# Patient Record
Sex: MALE | Race: BLACK OR AFRICAN AMERICAN | NOT HISPANIC OR LATINO | URBAN - METROPOLITAN AREA
[De-identification: names, ages, dates, MRNs, and addresses within clinical notes are randomized per-mention and may not be internally consistent; named-entity substitution may affect disease eponyms.]

---

## 2018-03-29 ENCOUNTER — INPATIENT (INPATIENT)
Facility: HOSPITAL | Age: 33
LOS: 0 days | Discharge: HOME | DRG: 446 | End: 2018-03-30
Attending: SURGERY | Admitting: SURGERY
Payer: COMMERCIAL

## 2018-03-29 VITALS
SYSTOLIC BLOOD PRESSURE: 120 MMHG | OXYGEN SATURATION: 99 % | DIASTOLIC BLOOD PRESSURE: 71 MMHG | TEMPERATURE: 98 F | RESPIRATION RATE: 18 BRPM | HEART RATE: 72 BPM | WEIGHT: 185.19 LBS

## 2018-03-29 LAB
ALBUMIN SERPL ELPH-MCNC: 4.5 G/DL — SIGNIFICANT CHANGE UP (ref 3.3–5)
ALP SERPL-CCNC: 60 U/L — SIGNIFICANT CHANGE UP (ref 40–120)
ALT FLD-CCNC: 18 U/L — SIGNIFICANT CHANGE UP (ref 10–45)
ANION GAP SERPL CALC-SCNC: 10 MMOL/L — SIGNIFICANT CHANGE UP (ref 5–17)
APPEARANCE UR: CLEAR — SIGNIFICANT CHANGE UP
APTT BLD: 29.5 SEC — SIGNIFICANT CHANGE UP (ref 27.5–37.4)
AST SERPL-CCNC: 21 U/L — SIGNIFICANT CHANGE UP (ref 10–40)
BASOPHILS NFR BLD AUTO: 0.5 % — SIGNIFICANT CHANGE UP (ref 0–2)
BILIRUB SERPL-MCNC: 0.4 MG/DL — SIGNIFICANT CHANGE UP (ref 0.2–1.2)
BILIRUB UR-MCNC: NEGATIVE — SIGNIFICANT CHANGE UP
BUN SERPL-MCNC: 17 MG/DL — SIGNIFICANT CHANGE UP (ref 7–23)
CALCIUM SERPL-MCNC: 9.6 MG/DL — SIGNIFICANT CHANGE UP (ref 8.4–10.5)
CHLORIDE SERPL-SCNC: 102 MMOL/L — SIGNIFICANT CHANGE UP (ref 96–108)
CO2 SERPL-SCNC: 28 MMOL/L — SIGNIFICANT CHANGE UP (ref 22–31)
COLOR SPEC: YELLOW — SIGNIFICANT CHANGE UP
CREAT SERPL-MCNC: 1.17 MG/DL — SIGNIFICANT CHANGE UP (ref 0.5–1.3)
DIFF PNL FLD: NEGATIVE — SIGNIFICANT CHANGE UP
EOSINOPHIL NFR BLD AUTO: 7.6 % — HIGH (ref 0–6)
GLUCOSE SERPL-MCNC: 112 MG/DL — HIGH (ref 70–99)
GLUCOSE UR QL: NEGATIVE — SIGNIFICANT CHANGE UP
HCT VFR BLD CALC: 41.5 % — SIGNIFICANT CHANGE UP (ref 39–50)
HGB BLD-MCNC: 14 G/DL — SIGNIFICANT CHANGE UP (ref 13–17)
INR BLD: 1.04 — SIGNIFICANT CHANGE UP (ref 0.88–1.16)
KETONES UR-MCNC: NEGATIVE — SIGNIFICANT CHANGE UP
LEUKOCYTE ESTERASE UR-ACNC: NEGATIVE — SIGNIFICANT CHANGE UP
LIDOCAIN IGE QN: 37 U/L — SIGNIFICANT CHANGE UP (ref 7–60)
LYMPHOCYTES # BLD AUTO: 36.9 % — SIGNIFICANT CHANGE UP (ref 13–44)
MCHC RBC-ENTMCNC: 28.7 PG — SIGNIFICANT CHANGE UP (ref 27–34)
MCHC RBC-ENTMCNC: 33.7 G/DL — SIGNIFICANT CHANGE UP (ref 32–36)
MCV RBC AUTO: 85.2 FL — SIGNIFICANT CHANGE UP (ref 80–100)
MONOCYTES NFR BLD AUTO: 8.5 % — SIGNIFICANT CHANGE UP (ref 2–14)
NEUTROPHILS NFR BLD AUTO: 46.5 % — SIGNIFICANT CHANGE UP (ref 43–77)
NITRITE UR-MCNC: NEGATIVE — SIGNIFICANT CHANGE UP
PH UR: 6 — SIGNIFICANT CHANGE UP (ref 5–8)
PLATELET # BLD AUTO: 235 K/UL — SIGNIFICANT CHANGE UP (ref 150–400)
POTASSIUM SERPL-MCNC: 4.5 MMOL/L — SIGNIFICANT CHANGE UP (ref 3.5–5.3)
POTASSIUM SERPL-SCNC: 4.5 MMOL/L — SIGNIFICANT CHANGE UP (ref 3.5–5.3)
PROT SERPL-MCNC: 7.7 G/DL — SIGNIFICANT CHANGE UP (ref 6–8.3)
PROT UR-MCNC: NEGATIVE MG/DL — SIGNIFICANT CHANGE UP
PROTHROM AB SERPL-ACNC: 11.5 SEC — SIGNIFICANT CHANGE UP (ref 9.8–12.7)
RBC # BLD: 4.87 M/UL — SIGNIFICANT CHANGE UP (ref 4.2–5.8)
RBC # FLD: 11.7 % — SIGNIFICANT CHANGE UP (ref 10.3–16.9)
SODIUM SERPL-SCNC: 140 MMOL/L — SIGNIFICANT CHANGE UP (ref 135–145)
SP GR SPEC: <=1.005 — SIGNIFICANT CHANGE UP (ref 1–1.03)
UROBILINOGEN FLD QL: 0.2 E.U./DL — SIGNIFICANT CHANGE UP
WBC # BLD: 4.3 K/UL — SIGNIFICANT CHANGE UP (ref 3.8–10.5)
WBC # FLD AUTO: 4.3 K/UL — SIGNIFICANT CHANGE UP (ref 3.8–10.5)

## 2018-03-29 PROCEDURE — 74177 CT ABD & PELVIS W/CONTRAST: CPT | Mod: 26

## 2018-03-29 PROCEDURE — 99285 EMERGENCY DEPT VISIT HI MDM: CPT

## 2018-03-29 RX ORDER — CIPROFLOXACIN LACTATE 400MG/40ML
400 VIAL (ML) INTRAVENOUS ONCE
Qty: 0 | Refills: 0 | Status: COMPLETED | OUTPATIENT
Start: 2018-03-29 | End: 2018-03-29

## 2018-03-29 RX ORDER — SODIUM CHLORIDE 9 MG/ML
1000 INJECTION, SOLUTION INTRAVENOUS
Qty: 0 | Refills: 0 | Status: DISCONTINUED | OUTPATIENT
Start: 2018-03-29 | End: 2018-03-30

## 2018-03-29 RX ORDER — ONDANSETRON 8 MG/1
4 TABLET, FILM COATED ORAL EVERY 6 HOURS
Qty: 0 | Refills: 0 | Status: DISCONTINUED | OUTPATIENT
Start: 2018-03-29 | End: 2018-03-30

## 2018-03-29 RX ORDER — CIPROFLOXACIN LACTATE 400MG/40ML
400 VIAL (ML) INTRAVENOUS EVERY 12 HOURS
Qty: 0 | Refills: 0 | Status: DISCONTINUED | OUTPATIENT
Start: 2018-03-30 | End: 2018-03-30

## 2018-03-29 RX ORDER — IOHEXOL 300 MG/ML
50 INJECTION, SOLUTION INTRAVENOUS ONCE
Qty: 0 | Refills: 0 | Status: COMPLETED | OUTPATIENT
Start: 2018-03-29 | End: 2018-03-29

## 2018-03-29 RX ORDER — HEPARIN SODIUM 5000 [USP'U]/ML
5000 INJECTION INTRAVENOUS; SUBCUTANEOUS EVERY 8 HOURS
Qty: 0 | Refills: 0 | Status: DISCONTINUED | OUTPATIENT
Start: 2018-03-29 | End: 2018-03-30

## 2018-03-29 RX ORDER — METRONIDAZOLE 500 MG
500 TABLET ORAL ONCE
Qty: 0 | Refills: 0 | Status: COMPLETED | OUTPATIENT
Start: 2018-03-29 | End: 2018-03-29

## 2018-03-29 RX ORDER — METRONIDAZOLE 500 MG
500 TABLET ORAL EVERY 8 HOURS
Qty: 0 | Refills: 0 | Status: DISCONTINUED | OUTPATIENT
Start: 2018-03-30 | End: 2018-03-30

## 2018-03-29 RX ORDER — HYDROMORPHONE HYDROCHLORIDE 2 MG/ML
0.5 INJECTION INTRAMUSCULAR; INTRAVENOUS; SUBCUTANEOUS EVERY 4 HOURS
Qty: 0 | Refills: 0 | Status: DISCONTINUED | OUTPATIENT
Start: 2018-03-29 | End: 2018-03-30

## 2018-03-29 RX ORDER — SODIUM CHLORIDE 9 MG/ML
3 INJECTION INTRAMUSCULAR; INTRAVENOUS; SUBCUTANEOUS ONCE
Qty: 0 | Refills: 0 | Status: COMPLETED | OUTPATIENT
Start: 2018-03-29 | End: 2018-03-29

## 2018-03-29 RX ADMIN — HEPARIN SODIUM 5000 UNIT(S): 5000 INJECTION INTRAVENOUS; SUBCUTANEOUS at 22:49

## 2018-03-29 RX ADMIN — SODIUM CHLORIDE 3 MILLILITER(S): 9 INJECTION INTRAMUSCULAR; INTRAVENOUS; SUBCUTANEOUS at 16:06

## 2018-03-29 RX ADMIN — IOHEXOL 50 MILLILITER(S): 300 INJECTION, SOLUTION INTRAVENOUS at 16:11

## 2018-03-29 RX ADMIN — Medication 200 MILLIGRAM(S): at 19:45

## 2018-03-29 RX ADMIN — SODIUM CHLORIDE 125 MILLILITER(S): 9 INJECTION, SOLUTION INTRAVENOUS at 22:37

## 2018-03-29 RX ADMIN — Medication 100 MILLIGRAM(S): at 21:00

## 2018-03-29 NOTE — H&P ADULT - HISTORY OF PRESENT ILLNESS
33 yo male with no PMH/PSH, presents to the ED with 3 weeks of intermittent abdominal pain. Patient describes pain as intermittent, localized in epigastric and mid abdomen, non-radiating, sharp, sometimes related to food. Denies fevers, chills, nausea, emesis, diarrhea, constipation or urinary symptoms. Saw MD few days ago that prescribed antacids with no relief. Had CT yesterday that showed possibly inflammed appendix and sigmoid colitis. Patient was sent to the ED for further evaluation.    Denies family hx of IBD/colon ca.

## 2018-03-29 NOTE — ED PROVIDER NOTE - ATTENDING CONTRIBUTION TO CARE
The pt is a 31 y/o M, who presents to ED for eval of abd pain x 3-4 wks, had outpatient ct yest and read was inconclusive, hence sent to ED by pmd for ct and eval. Pt states pain was initially to mid and upper abd, then became diffuse, pain cramp like, aggravated w/eating, has not been taking any pain meds.  Ate burger PTA. Denies n/v/d, anorexia, fevers, chills, dysuria, flank pain, cp, sob .  Pt with mild rlq tenderness on exam, ct with tip appendicitis, eval by surgery who will admit pt for observation.  Otherwise nontoxic appearing with no acute complaints.

## 2018-03-29 NOTE — ED ADULT NURSE NOTE - OBJECTIVE STATEMENT
Pt presents to ED c/o abdominal pain x3 weeks. Pt with intermittent abdominal pain, first week constant, now last two weeks pt reports pain mostly after eating then resolves. Pt ate a burger about an hours ago, had pain in waiting room per pt but now no pain. Pt reports pain to LUQ and upper medial abdomen. Pt also reports intermittent diarrhea. Pt denies fevers, chills, N/V. Pt had CT scan outpt yesterday, has read of scan and lab results but does not have disc of scan. Pt presents in NAD speaking full sentences ambulatory through triage.

## 2018-03-29 NOTE — ED ADULT TRIAGE NOTE - CHIEF COMPLAINT QUOTE
c/o generalized pain x 3 weeks. Denies n/v/d, f/c. Pt states had CT done yesterday and was informed to go to the ED r/o acute appendicitis/colitis.

## 2018-03-29 NOTE — H&P ADULT - NSHPLABSRESULTS_GEN_ALL_CORE
Comprehensive Metabolic Panel (03.29.18 @ 16:09)    Sodium, Serum: 140 mmol/L    Potassium, Serum: 4.5 mmol/L    Chloride, Serum: 102 mmol/L    Carbon Dioxide, Serum: 28 mmol/L    Anion Gap, Serum: 10 mmol/L    Blood Urea Nitrogen, Serum: 17 mg/dL    Creatinine, Serum: 1.17 mg/dL    Glucose, Serum: 112 mg/dL    Calcium, Total Serum: 9.6 mg/dL    Protein Total, Serum: 7.7 g/dL    Albumin, Serum: 4.5 g/dL    Bilirubin Total, Serum: 0.4 mg/dL    Alkaline Phosphatase, Serum: 60 U/L    Aspartate Aminotransferase (AST/SGOT): 21 U/L    Alanine Aminotransferase (ALT/SGPT): 18 U/L    Complete Blood Count + Automated Diff (03.29.18 @ 16:09)    WBC Count: 4.3 K/uL    RBC Count: 4.87 M/uL    Hemoglobin: 14.0 g/dL    Hematocrit: 41.5 %    Mean Cell Volume: 85.2 fL    Mean Cell Hemoglobin: 28.7 pg    Mean Cell Hemoglobin Conc: 33.7 g/dL    Red Cell Distrib Width: 11.7 %    Platelet Count - Automated: 235 K/uL    Urinalysis (03.29.18 @ 16:20)    Glucose Qualitative, Urine: NEGATIVE    Blood, Urine: NEGATIVE    pH Urine: 6.0    Color: Yellow    Urine Appearance: Clear    Bilirubin: Negative    Ketone - Urine: NEGATIVE    Specific Gravity: <=1.005    Protein, Urine: NEGATIVE mg/dL    Urobilinogen: 0.2 E.U./dL    Nitrite: NEGATIVE    Leukocyte Esterase Concentration: NEGATIVE    < from: CT Abdomen and Pelvis w/ Oral Cont and w/ IV Cont (03.29.18 @ 17:35) >    IMPRESSION:   1.  Findings suspicious for tip appendicitis.   2.  Nonspecific colitis involving the ascending, descending and sigmoid   colon. Infectious versus inflammatory etiology.  3.  Mild cardiomegaly, atypical in a patient of this age. Recommend   echocardiogram.  4.  Mild wall thickening of the urinary bladder at the level of the   fundus. Mild prostatic enlargement for a patient of this age. Correlation   with urinalysis is recommended.

## 2018-03-29 NOTE — ED PROVIDER NOTE - MEDICAL DECISION MAKING DETAILS
pt w/abd pain x 3-4 wks, had outpatient ct yest but read w/o definitive dx hence w/u repeated - normal labs, ct w/tip appy, surg consulted and will admit for iv abx and further tx

## 2018-03-29 NOTE — ED PROVIDER NOTE - OBJECTIVE STATEMENT
The pt is a 31 y/o M, who presents to ED for eval of abd pain x 3-4 wks, had outpatient ct yest and read was inconclusive, hence sent to ED by pmd for ct and eval. Pt states pain was initially to mid and upper abd, then became diffuse, pain cramp like, aggravated w/eating, has not been taking any pain meds.  Ate burger PTA. Denies n/v/d, anorexia, fevers, chills, dysuria, flank pain, cp, sob

## 2018-03-29 NOTE — H&P ADULT - NSHPPHYSICALEXAM_GEN_ALL_CORE
Alert and oriented x 3, in no acute distress  Non-labored breathing on RA  RRR, normotensive  Abdomen soft, non-distended, mildly tender in right mid abdomen, no rebound or guarding

## 2018-03-30 VITALS
TEMPERATURE: 99 F | DIASTOLIC BLOOD PRESSURE: 61 MMHG | OXYGEN SATURATION: 97 % | SYSTOLIC BLOOD PRESSURE: 111 MMHG | HEART RATE: 68 BPM | RESPIRATION RATE: 17 BRPM

## 2018-03-30 LAB
HCT VFR BLD CALC: 40.8 % — SIGNIFICANT CHANGE UP (ref 39–50)
HGB BLD-MCNC: 13.6 G/DL — SIGNIFICANT CHANGE UP (ref 13–17)
MCHC RBC-ENTMCNC: 29 PG — SIGNIFICANT CHANGE UP (ref 27–34)
MCHC RBC-ENTMCNC: 33.3 G/DL — SIGNIFICANT CHANGE UP (ref 32–36)
MCV RBC AUTO: 87 FL — SIGNIFICANT CHANGE UP (ref 80–100)
PLATELET # BLD AUTO: 199 K/UL — SIGNIFICANT CHANGE UP (ref 150–400)
RBC # BLD: 4.69 M/UL — SIGNIFICANT CHANGE UP (ref 4.2–5.8)
RBC # FLD: 11.9 % — SIGNIFICANT CHANGE UP (ref 10.3–16.9)
WBC # BLD: 4.3 K/UL — SIGNIFICANT CHANGE UP (ref 3.8–10.5)
WBC # FLD AUTO: 4.3 K/UL — SIGNIFICANT CHANGE UP (ref 3.8–10.5)

## 2018-03-30 PROCEDURE — 83690 ASSAY OF LIPASE: CPT

## 2018-03-30 PROCEDURE — 76705 ECHO EXAM OF ABDOMEN: CPT

## 2018-03-30 PROCEDURE — 86900 BLOOD TYPING SEROLOGIC ABO: CPT

## 2018-03-30 PROCEDURE — 99285 EMERGENCY DEPT VISIT HI MDM: CPT | Mod: 25

## 2018-03-30 PROCEDURE — 36415 COLL VENOUS BLD VENIPUNCTURE: CPT

## 2018-03-30 PROCEDURE — 76705 ECHO EXAM OF ABDOMEN: CPT | Mod: 26

## 2018-03-30 PROCEDURE — 86901 BLOOD TYPING SEROLOGIC RH(D): CPT

## 2018-03-30 PROCEDURE — 81003 URINALYSIS AUTO W/O SCOPE: CPT

## 2018-03-30 PROCEDURE — 85027 COMPLETE CBC AUTOMATED: CPT

## 2018-03-30 PROCEDURE — 85730 THROMBOPLASTIN TIME PARTIAL: CPT

## 2018-03-30 PROCEDURE — 85025 COMPLETE CBC W/AUTO DIFF WBC: CPT

## 2018-03-30 PROCEDURE — 85610 PROTHROMBIN TIME: CPT

## 2018-03-30 PROCEDURE — 86850 RBC ANTIBODY SCREEN: CPT

## 2018-03-30 PROCEDURE — 80053 COMPREHEN METABOLIC PANEL: CPT

## 2018-03-30 PROCEDURE — 74177 CT ABD & PELVIS W/CONTRAST: CPT

## 2018-03-30 PROCEDURE — 96374 THER/PROPH/DIAG INJ IV PUSH: CPT | Mod: XU

## 2018-03-30 RX ORDER — ACETAMINOPHEN 500 MG
650 TABLET ORAL ONCE
Qty: 0 | Refills: 0 | Status: COMPLETED | OUTPATIENT
Start: 2018-03-30 | End: 2018-03-30

## 2018-03-30 RX ADMIN — HEPARIN SODIUM 5000 UNIT(S): 5000 INJECTION INTRAVENOUS; SUBCUTANEOUS at 13:50

## 2018-03-30 RX ADMIN — Medication 650 MILLIGRAM(S): at 16:29

## 2018-03-30 RX ADMIN — HEPARIN SODIUM 5000 UNIT(S): 5000 INJECTION INTRAVENOUS; SUBCUTANEOUS at 05:38

## 2018-03-30 RX ADMIN — Medication 100 MILLIGRAM(S): at 12:05

## 2018-03-30 RX ADMIN — Medication 200 MILLIGRAM(S): at 07:25

## 2018-03-30 RX ADMIN — Medication 100 MILLIGRAM(S): at 05:38

## 2018-03-30 NOTE — DISCHARGE NOTE ADULT - CARE PLAN
Principal Discharge DX:	Appendicitis  Goal:	follow up in the office  Assessment and plan of treatment:	patient unlikely to have acute appendicitis, more likely biliary in etiology, patient to see dr. sonal duron in office and receive a HIDA scan as an outpatient, patient can take tylenol PRN pain

## 2018-03-30 NOTE — DISCHARGE NOTE ADULT - PATIENT PORTAL LINK FT
You can access the [facility] Patient Portal, offered by [facility], by registering with the following website: [email or web address]

## 2018-03-30 NOTE — DISCHARGE NOTE ADULT - CARE PROVIDER_API CALL
Darci Dong), Surgery  [address] [place] [address]  Phone: [phone]  Fax: [phone]

## 2018-03-30 NOTE — DISCHARGE NOTE ADULT - PLAN OF CARE
follow up in the office patient unlikely to have acute appendicitis, more likely biliary in etiology, patient to see dr. sonal duron in office and receive a HIDA scan as an outpatient, patient can take tylenol PRN pain

## 2018-03-30 NOTE — PROGRESS NOTE ADULT - SUBJECTIVE AND OBJECTIVE BOX
o/n: admitted with possible early appendicitis o/n: admitted with possible early appendicitis       Vital Signs Last 24 Hrs  T(C): 36.7 (30 Mar 2018 09:57), Max: 36.9 (29 Mar 2018 15:29)  T(F): 98 (30 Mar 2018 09:57), Max: 98.4 (29 Mar 2018 15:29)  HR: 75 (30 Mar 2018 09:57) (52 - 75)  BP: 96/61 (30 Mar 2018 09:57) (96/61 - 124/77)  BP(mean): --  RR: 17 (30 Mar 2018 09:57) (17 - 18)  SpO2: 99% (30 Mar 2018 09:57) (98% - 100%)      I&O's Summary    29 Mar 2018 07:01  -  30 Mar 2018 07:00  --------------------------------------------------------  IN: 975 mL / OUT: 0 mL / NET: 975 mL        Gen: NAD   Abd: soft, mildly ttp, nd       31 yo male with 3 weeks of abdominal pain and CT concerning for early acute appendicitis and unspecified colitis   - admit to surgery under care of Dr. Dong   - NPO   - IVF   - Cipro/Flagyl   - labs in am  - will re evaluate for OR

## 2018-03-30 NOTE — PROGRESS NOTE ADULT - ASSESSMENT
31 yo male with 3 weeks of abdominal pain and CT concerning for early acute appendicitis and unspecified colitis   - admit to surgery under care of Dr. Dong   - NPO   - IVF   - Cipro/Flagyl   - labs in am   - regional level of care   - discussed with chief resident and Dr. Dong

## 2018-04-02 DIAGNOSIS — K52.9 NONINFECTIVE GASTROENTERITIS AND COLITIS, UNSPECIFIED: ICD-10-CM

## 2018-04-02 DIAGNOSIS — R10.9 UNSPECIFIED ABDOMINAL PAIN: ICD-10-CM

## 2018-04-02 DIAGNOSIS — K83.9 DISEASE OF BILIARY TRACT, UNSPECIFIED: ICD-10-CM

## 2018-04-02 DIAGNOSIS — Z28.21 IMMUNIZATION NOT CARRIED OUT BECAUSE OF PATIENT REFUSAL: ICD-10-CM

## 2018-04-18 NOTE — ED PROVIDER NOTE - INPATIENT RESIDENT/ACP NOTIFIED
ORTHOPEDIC CONSULT      Chief Complaint: Medardo Gautam is a 78 year old ambidextrous male who is retired but used to work as a  at [employer] and traveled to [place] etc.  Enjoys playing tennis and golf.      He is being seen for   Chief Complaints and History of Present Illnesses   Patient presents with     Consult     rt wrist pain per Gayle Garner APRN CNP         History of Present Illness:   Medardo Gautam is a 78 year old male who is seen in consultation at the request of Gayle Garner APRN CNP  .  History of Present illness:  Medardo presents for evaluation of:  1.) rt wrist pain   Onset: 4/16/18  Symptoms brought on by fall shoveling snow.   Character:  sharp, dull ache and swelling.    Progression of symptoms:  getting better  Previous similar pain: no,  He denies any surgery or major injury to his wrist other than may be in his childhood.  Patient did have surgery on his hand for Dupuytren's on his small digit approximately 8 years ago.  Pain Level:  4/10.   Previous treatments:  ice, support wrap and NSAID - naproxen advil.  All these treatments have helped slightly.  Patient has not had any injections or any formal therapy.  Patient does have a removable brace.  Currently on Blood thinners? no  Diagnosis of Diabetes? no  Additional History: Patient denies any numbness or tingling.  Patient also states there is no pain unless he moves his wrist.  He has some pain with wrist flexion.    Patient's past medical, surgical, social and family histories reviewed.     Past Medical History:   Diagnosis Date     Anxiety      Colon cancer (H) 01/2015     Contracture of finger joint ca 2005    left and right fifth fingers     Dupuytren's contracture of left hand      Gout      HEMORRHOIDS NOS 6/4/2007     Hypertension      Insomnia      Nonsenile cataract      Personal history of colonic polyps 7 years ago?     Prostate cancer (H) Feb 2012     Renal cyst 6/22/2017     Seborrheic  dermatitis      Skin lesion- R side of face and behind L ear 12/15/2011     SKIN SENSATION DISTURB 12/29/2006    allergic reaction to strawberries     SKIN SENSATION DISTURB 12/29/2006         Past Surgical History:   Procedure Laterality Date     APPENDECTOMY       BIOPSY  01/16/15     C HAND/FINGER SURGERY UNLISTED       C LENGTHEN,TENDON,HAND/FINGER  ca 2007    right fifth     C STOMACH SURGERY PROCEDURE UNLISTED       CATARACT IOL, RT/LT Left 02/15/2018     COLON SURGERY  2/11/2015    Lap assisted R hemicolectomy     COLONOSCOPY  10/25/07    Snare polypectomy     COLONOSCOPY  6/25/2009    with snare polypectomy     COLONOSCOPY  9/30/2009     COLONOSCOPY  1/5/2011    COLONOSCOPY performed by JUD MARIEE at  GI     COLONOSCOPY N/A 1/16/2015    Procedure: COMBINED COLONOSCOPY, SINGLE OR MULTIPLE BIOPSY/POLYPECTOMY BY BIOPSY;  Surgeon: Sarah Beth Pisano MD;  Location: [facility] OR     COLONOSCOPY WITH CO2 INSUFFLATION N/A 1/16/2015    Procedure: COLONOSCOPY WITH CO2 INSUFFLATION;  Surgeon: Sarah Beth Pisano MD;  Location: [facility] OR     DAVINCI PROSTATECTOMY  8/6/2012    Procedure: DAVINCI PROSTATECTOMY;  Davinci Assisted Radical Prostatectomy with Bilateral Lymphadenectomy ;  Surgeon: Norma Goodwin MD;  Location: [facility] OR     GENITOURINARY SURGERY      prostate surgery     INJECT EPIDURAL LUMBAR / SACRAL SINGLE Left 10/30/2017    Procedure: INJECT EPIDURAL LUMBAR / SACRAL SINGLE;  Left Transforaminal Lumbar 4-Lumbar 5 Epidural Steroid Injection;  Surgeon: Nuvia Reina MD;  Location: [facility] OR     LAPAROSCOPIC ASSISTED COLECTOMY N/A 2/11/2015    Procedure: LAPAROSCOPIC ASSISTED COLECTOMY;  Surgeon: Oren Breen MD;  Location: [facility] OR     PHACOEMULSIFICATION CLEAR CORNEA WITH STANDARD INTRAOCULAR LENS IMPLANT Left 2/15/2018    Procedure: PHACOEMULSIFICATION CLEAR CORNEA WITH STANDARD INTRAOCULAR LENS IMPLANT;  LEFT EYE CATARACT EXTRACTION WITH STANDARD INTRAOCULAR LENS IMPLANT ;  Surgeon: Reyna  Brandon Eagle MD;  Location: [facility]     PHACOEMULSIFICATION CLEAR CORNEA WITH STANDARD INTRAOCULAR LENS IMPLANT Right 3/1/2018    Procedure: PHACOEMULSIFICATION CLEAR CORNEA WITH STANDARD INTRAOCULAR LENS IMPLANT;  RIGHT EYE CATARACT EXTRACTION WITH STANDARD INTRAOCULAR LENS IMPLANT ;  Surgeon: Brandon Orellana MD;  Location: [facility]       Medications:    Current Outpatient Prescriptions on File Prior to Visit:  aspirin 81 MG tablet Take 1 tablet (81 mg) by mouth daily   azelastine (OPTIVAR) 0.05 % SOLN ophthalmic solution Apply 1 drop to eye 2 times daily   azelastine (OPTIVAR) 0.05 % SOLN ophthalmic solution Apply 1 drop to eye 2 times daily   bicalutamide (CASODEX) 50 MG tablet Take 1 tablet (50 mg) by mouth daily   calcium-vitamin D (CALTRATE) 600-400 MG-UNIT per tablet Take 1 tablet by mouth daily   clonazePAM (KLONOPIN) 1 MG tablet Take 1 tablet (1 mg) by mouth nightly as needed for anxiety   folic acid-vit B6-vit B12 (FOLGARD) 0.8-10-0.115 MG TABS Take 1 tablet by mouth daily   IBUPROFEN PO Take 400 mg by mouth every 8 hours as needed for moderate pain   lisinopril (PRINIVIL/ZESTRIL) 10 MG tablet Take 1 tablet (10 mg) by mouth daily   Loratadine (CLARITIN PO) Take  by mouth. As needed    naproxen (NAPROSYN) 500 MG tablet TAKE ONE TABLET BY MOUTH TWICE DAILY AS NEEDED FOR  MODERATE  PAIN   Omega-3 Fatty Acids (OMEGA-3 FISH OIL PO) Take 500 mg by mouth daily   sildenafil (VIAGRA) 100 MG tablet 1/2 tab three times a week   triamcinolone (KENALOG) 0.1 % cream    zolpidem (AMBIEN) 5 MG tablet Take 1 tablet (5 mg) by mouth nightly as needed for sleep     No current facility-administered medications on file prior to visit.     No Known Allergies    Social History     Occupational History     retired       Retired     Social History Main Topics     Smoking status: Former Smoker     Years: 1.00     Types: Cigarettes, Cigars, Pipe     Start date: 10/1/1961     Quit date: 1/1/1962     Smokeless tobacco: Never Used       Comment: No smokers in home     Alcohol use 0.0 oz/week      Comment: daily glass of wine and whiskey     Drug use: Yes     Special: Benzodiazepines     Sexual activity: Not Currently     Partners: Female     Birth control/ protection: None      Comment: not currently active       Family History   Problem Relation Age of Onset     CEREBROVASCULAR DISEASE Father      CANCER Mother 90     Patient believes vaginal cancer - hysterectomy,      Alzheimer Disease Mother      CANCER Sister      Breast Cancer Sister      C.A.D. No family hx of      Cancer - colorectal No family hx of      Prostate Cancer No family hx of      Blood Disease No family hx of      Cardiovascular No family hx of      Circulatory No family hx of      Eye Disorder No family hx of      GASTROINTESTINAL DISEASE No family hx of      Genitourinary Problems No family hx of      Lipids No family hx of      Neurologic Disorder No family hx of      Respiratory No family hx of      Asthma No family hx of      HEART DISEASE No family hx of      DIABETES No family hx of      Hypertension No family hx of      Arthritis No family hx of      Thyroid Disease No family hx of      Musculoskeletal Disorder No family hx of      Glaucoma No family hx of      Macular Degeneration No family hx of      REVIEW OF SYSTEMS  10 point review systems performed otherwise negative as noted as per history of present illness.    Physical Exam:  Vitals: /82  Pulse 77  Wt 91.6 kg (202 lb)  BMI 31.64 kg/m2  BMI= Body mass index is 31.64 kg/(m^2).    Constitutional: healthy, alert and no acute distress   Psychiatric: mentation appears normal and affect normal/bright  NEURO: no focal deficits, CMS intact right upper extremity  RESP: Normal with easy respirations and no use of accessory muscles to breathe, no audible wheezing or retractions  CV: +2 radial pulse and his hand is warm to plapation.   SKIN: No erythema, rashes, excoriation, or breakdown. No evidence of  infection.   MUSCULOSKELETAL:    INSPECTION of right wrist: Very slight swelling when compared to the contralateral side.  No gross deformities, erythema, edema, ecchymosis, atrophy or fasciculations.     PALPATION: Very slight amount of tenderness to palpation on the volar side of the distal radius.  No other tenderness to palpation of the radius or ulna or hand digits or forearm or elbow.  No increased warmth.  ROM: flexion approximately 45  compared to the contralateral side that flexes to approximately 65 ., extension approximately 45  compared to the contralateral side that extends to approximately 90 ., supination approximately 90 , pronation approximately 90 . The range of motion is without catching locking or pain.        STRENGTH: 5 out of 5  and thumb strength and interosseous strength without pain.    SPECIAL TEST: Negative snuffbox tenderness.  GAIT: non-antalgic  Lymph: no palpable lymph nodes    Diagnostic Modalities:  Recent Results (from the past 744 hour(s))   XR Wrist Right G/E 3 Views    Narrative    WRIST RIGHT THREE OR MORE VIEWS April 17, 2018 10:47 AM     HISTORY: Right wrist pain.    COMPARISON: None.      FINDINGS: There is irregularity of the ulnar aspect of the distal  radius including the articular surface most consistent with  intra-articular distal radial epiphyseal and metaphyseal fracture.  This appears subacute or chronic but is not completely healed. There  is buckling of the radial and ulnar cortices of the scaphoid waist  which could represent a not significantly displaced scaphoid fracture  in the appropriate clinical setting although this could also be  secondary to chronic changes. There is widening of the scapholunate  space indicating scapholunate ligament tear. No other fractures are  identified. Joint space loss is seen at the second and third  metacarpophalangeal joints with associated mild osteophytosis. Mild  STT joint space loss of the wrist is noted. Remaining  joint spaces  appear grossly maintained.      Impression    IMPRESSION:    1. Intra-articular, epiphyseal and metaphyseal fracture of the ulnar  aspect of the distal radius of indeterminate age but may be subacute  or chronic. An acute extension is not excluded.  2. Scapholunate ligament injury given the widening of scapholunate  space.  3. Irregularity of the cortices of the scaphoid waist could represent  a subtle not significantly displaced fracture in the appropriate  setting.  4. Diffuse osteopenia.  5. Degenerative changes of the STT and second and third  metacarpophalangeal joints.    I discussed these findings with Gayle Garner on 4/17/2018 at 11:09  AM.    SHELLEY GARCIA MD     We feel that the reading above is good however on exam the patient does not seem to have the tenderness that we would expect with the injuries above.  Independent visualization of the images was performed.    Impression: 1.  2 days status post Right wrist sprain     Plan:  All of the above pertinent physical exam and imaging modalities findings was reviewed with Medardo.                                          CONSERVATIVE CARE:    Patient Instructions:  1.  On your x-ray we do feel there is nothing new or  an acute fracture.  The radiologist had read some possibilities of fracturing or ligament injury but was not able to examine you like we have.  2.  You have some wrist stiffness but that is most likely secondary to the injury.  3.  We would recommend wearing the wrist brace for the next 1-2 weeks and then gradually wean out of it.  4.  I have exercises below for you to start.  You can do this 2-3 times per day if you can.  5. Listen to your body and let the pain guide your activity, as in slow down if you are having a lot of pain, but increase activity gradually as the pain decreases.   6.  Gradual increase of weightbearing as you can tolerate.  Do not push it too fast.  7. Medication: continue with the naproxen you are  taking.  8. Might take 4-6 months to get fully better.  9. Follow up with Amy Ramírez MD and/or Cem Aragon PA-C in 4 weeks, or if you are doing well, you can cancel the appointment and follow up as needed.   Re-x-ray on return: Yes, if the patient has more pain when he comes back then we would get x-rays of the right wrist AP lateral and oblique outside of the splint.    BP Readings from Last 1 Encounters:   04/18/18 126/82       BP noted to be well controlled today in office.      Patient does not use Tobacco products.    Scribed by Cem Aragon PA-C on 4/18/2018 at 9:07 AM, based on Dr. Amy Ramírez's statements to me.    This note was dictated with [employer].    IRENE Doss MD     surg
